# Patient Record
Sex: FEMALE | Race: OTHER | HISPANIC OR LATINO | URBAN - METROPOLITAN AREA
[De-identification: names, ages, dates, MRNs, and addresses within clinical notes are randomized per-mention and may not be internally consistent; named-entity substitution may affect disease eponyms.]

---

## 2018-01-18 ENCOUNTER — EMERGENCY (EMERGENCY)
Facility: HOSPITAL | Age: 53
LOS: 1 days | Discharge: HOME | End: 2018-01-18
Attending: EMERGENCY MEDICINE | Admitting: EMERGENCY MEDICINE
Payer: COMMERCIAL

## 2018-01-18 VITALS
WEIGHT: 220.02 LBS | RESPIRATION RATE: 18 BRPM | HEART RATE: 86 BPM | TEMPERATURE: 98 F | SYSTOLIC BLOOD PRESSURE: 124 MMHG | OXYGEN SATURATION: 95 % | DIASTOLIC BLOOD PRESSURE: 83 MMHG

## 2018-01-18 VITALS
SYSTOLIC BLOOD PRESSURE: 133 MMHG | TEMPERATURE: 98 F | DIASTOLIC BLOOD PRESSURE: 84 MMHG | OXYGEN SATURATION: 100 % | HEART RATE: 85 BPM | RESPIRATION RATE: 16 BRPM

## 2018-01-18 DIAGNOSIS — M79.661 PAIN IN RIGHT LOWER LEG: ICD-10-CM

## 2018-01-18 DIAGNOSIS — M79.89 OTHER SPECIFIED SOFT TISSUE DISORDERS: ICD-10-CM

## 2018-01-18 LAB
ALBUMIN SERPL ELPH-MCNC: 3.7 G/DL — SIGNIFICANT CHANGE UP (ref 3.3–5)
ALP SERPL-CCNC: 67 U/L — SIGNIFICANT CHANGE UP (ref 40–120)
ALT FLD-CCNC: 16 U/L — SIGNIFICANT CHANGE UP (ref 10–45)
ANION GAP SERPL CALC-SCNC: 12 MMOL/L — SIGNIFICANT CHANGE UP (ref 5–17)
AST SERPL-CCNC: 20 U/L — SIGNIFICANT CHANGE UP (ref 10–40)
BILIRUB SERPL-MCNC: 0.8 MG/DL — SIGNIFICANT CHANGE UP (ref 0.2–1.2)
BUN SERPL-MCNC: 11 MG/DL — SIGNIFICANT CHANGE UP (ref 7–23)
CALCIUM SERPL-MCNC: 8.4 MG/DL — SIGNIFICANT CHANGE UP (ref 8.4–10.5)
CHLORIDE SERPL-SCNC: 103 MMOL/L — SIGNIFICANT CHANGE UP (ref 96–108)
CO2 SERPL-SCNC: 25 MMOL/L — SIGNIFICANT CHANGE UP (ref 22–31)
CREAT SERPL-MCNC: 0.68 MG/DL — SIGNIFICANT CHANGE UP (ref 0.5–1.3)
GLUCOSE SERPL-MCNC: 92 MG/DL — SIGNIFICANT CHANGE UP (ref 70–99)
HCT VFR BLD CALC: 37.8 % — SIGNIFICANT CHANGE UP (ref 34.5–45)
HGB BLD-MCNC: 12.5 G/DL — SIGNIFICANT CHANGE UP (ref 11.5–15.5)
MCHC RBC-ENTMCNC: 30.9 PG — SIGNIFICANT CHANGE UP (ref 27–34)
MCHC RBC-ENTMCNC: 33.1 G/DL — SIGNIFICANT CHANGE UP (ref 32–36)
MCV RBC AUTO: 93.3 FL — SIGNIFICANT CHANGE UP (ref 80–100)
PLATELET # BLD AUTO: 149 K/UL — LOW (ref 150–400)
POTASSIUM SERPL-MCNC: 4 MMOL/L — SIGNIFICANT CHANGE UP (ref 3.5–5.3)
POTASSIUM SERPL-SCNC: 4 MMOL/L — SIGNIFICANT CHANGE UP (ref 3.5–5.3)
PROT SERPL-MCNC: 7 G/DL — SIGNIFICANT CHANGE UP (ref 6–8.3)
RBC # BLD: 4.05 M/UL — SIGNIFICANT CHANGE UP (ref 3.8–5.2)
RBC # FLD: 12.9 % — SIGNIFICANT CHANGE UP (ref 10.3–16.9)
SODIUM SERPL-SCNC: 140 MMOL/L — SIGNIFICANT CHANGE UP (ref 135–145)
WBC # BLD: 8 K/UL — SIGNIFICANT CHANGE UP (ref 3.8–10.5)
WBC # FLD AUTO: 8 K/UL — SIGNIFICANT CHANGE UP (ref 3.8–10.5)

## 2018-01-18 PROCEDURE — 36415 COLL VENOUS BLD VENIPUNCTURE: CPT

## 2018-01-18 PROCEDURE — 80053 COMPREHEN METABOLIC PANEL: CPT

## 2018-01-18 PROCEDURE — 93970 EXTREMITY STUDY: CPT

## 2018-01-18 PROCEDURE — 85027 COMPLETE CBC AUTOMATED: CPT

## 2018-01-18 PROCEDURE — 99285 EMERGENCY DEPT VISIT HI MDM: CPT

## 2018-01-18 PROCEDURE — 99284 EMERGENCY DEPT VISIT MOD MDM: CPT | Mod: 25

## 2018-01-18 PROCEDURE — 93970 EXTREMITY STUDY: CPT | Mod: 26

## 2018-01-18 NOTE — ED PROVIDER NOTE - PLAN OF CARE
PHYSICAL THERAPY DAILY NOTE  Time In: 0473  Time Out: 1440  Patient Seen For: PM;Gait training;Patient education;Transfer training; Other (see progress notes)    Subjective: Patient reporting walking is easier with the AFO on but she will need to get use to it. Reports the AFO feels different than the ace wrap. Objective:Vital Signs:  Patient Vitals for the past 12 hrs:   Temp Pulse Resp BP SpO2   09/05/17 0754 97.9 °F (36.6 °C) 95 16 115/66 94 %     Pain level:No c/o pain  Pain location:NA  Pain interventions:NA    Patient education:gait training with right custom AFO. Instructed patient on how to don/doff AFO and skin inspection for foot ankle and fibular head. Patient verbalizing understanding    Interdisciplinary Communication:NA    Other (comment) (Fall precautions)  GROSS ASSESSMENT Daily Assessment     recd right custom AFO from orthotist. Will need to be reassessed by orthotist for making adjustments at ankle and foot plate. BED/MAT MOBILITY Daily Assessment    Rolling Right : 0 (Not tested)  Rolling Left : 0 (Not tested)  Supine to Sit : 0 (Not tested)  Sit to Supine : 0 (Not tested)       TRANSFERS Daily Assessment    Transfer Type: SPT without device (w/c to bed)  Transfer Assistance : 6 (Modified independent)  Sit to Stand Assistance: Modified independent  Car Transfers: Not tested       GAIT Daily Assessment    Amount of Assistance: 5 (Supervision/setup) verbal cues for improving LLE step length and RLE step clearance  Distance (ft): 150 Feet (ft)  Assistive Device: Cane, quad;Orthotic device (custom right AFO)   Gait training up/down 10 ft ramp with quad cane and SBA, slow start/stop step to hemiplegic gait pattern. Gait training x 200ft with shopping cart jhonatan right AFO facilitating cont step through gait pattern with increased step length and gait speed. Min assist to control shopping cart     STEPS or STAIRS Daily Assessment   Single step at a time going up/down 4 steps.  Right LE hip hiking and circumduction going up steps. Steps/Stairs Ambulated (#): 4  Level of Assist : 5 (Stand-by assistance)  Rail Use: Left        BALANCE Daily Assessment    Sitting - Static: Good (unsupported)  Sitting - Dynamic: Good (unsupported)  Standing - Static: Good  Standing - Dynamic : Impaired       WHEELCHAIR MOBILITY Daily Assessment    Able to Propel (ft): 150 feet  Functional Level: 6  Curbs/Ramps Assist Required (FIM Score): 0 (Not tested)  Wheelchair Setup Assist Required : 6 (Modified independent)  Wheelchair Management: Manages left brake;Manages right brake;Manages right footrest       LOWER EXTREMITY EXERCISES Daily Assessment    NA          Assessment: Improved step clearance step length and gait speed ambulating with AFO. Improved step clearance and foot placement going up/down steps and up/down ramp with AFO. Recommending patient get one shoe size larger for right foot       Patient returned to room at end of treatment. Patient supine in bed with head of bed elevated and bed rails up x 2. Needs placed in reach of patient.     Plan of Care: complete D/C assessment tomorrow AM    Anahy Jung, PT  9/5/2017 Please follow up with your primary care provider for further management of leg swelling.  The swelling is likely due to venous stasis/insufficiency.  You can wear compression stockings to help reduce the swelling.

## 2018-01-18 NOTE — ED ADULT NURSE NOTE - CHPI ED SYMPTOMS NEG
no fever/no dizziness/no vomiting/no chills/no tingling/no weakness/no numbness/no decreased eating/drinking/no nausea

## 2018-01-18 NOTE — ED ADULT TRIAGE NOTE - ARRIVAL INFO ADDITIONAL COMMENTS
c/o left knee pain and stiffness, swelling to left calf X 2 months - atraumatic. pt also reports nonproductive cough and runny nose X 3 days

## 2018-01-18 NOTE — ED PROVIDER NOTE - OBJECTIVE STATEMENT
52F w/ no known PHMx presents with 1.5 months of chronic B/L LE edema w/ tenderness.  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  She noted that last month, she developed B/L LE swelling but has not had it evaluated.  She now feels like her legs are going to burst and had some extra time today, so came for evaluation.  She has a mild non-productive cough w/out overt SOB.  She denies CP.

## 2018-01-18 NOTE — ED PROVIDER NOTE - MEDICAL DECISION MAKING DETAILS
-Likely venous stasis; less likely acute cardiac or DVT event.  CBC/CMP, LE doppler to eval w/ plan for d/c for outpatient follow up -Likely venous stasis; less likely acute cardiac or DVT event.  CBC/CMP, LE doppler to eval w/ plan for d/c for outpatient follow up  -Doppler unremarkable; labs unremarkable

## 2018-01-18 NOTE — ED PROVIDER NOTE - CONDUCTED A DETAILED DISCUSSION WITH PATIENT AND/OR GUARDIAN REGARDING, MDM
radiology results/lab results lab results/return to ED if symptoms worsen, persist or questions arise/radiology results

## 2018-01-18 NOTE — ED ADULT NURSE NOTE - OBJECTIVE STATEMENT
52 year old female w c/o of L lower leg pain.  Denies trauma or falls.  A+OX3, ambulatory w steady gait.

## 2018-01-18 NOTE — ED PROVIDER NOTE - CARE PLAN
Principal Discharge DX:	Leg swelling Principal Discharge DX:	Leg swelling  Assessment and plan of treatment:	Please follow up with your primary care provider for further management of leg swelling.  The swelling is likely due to venous stasis/insufficiency.  You can wear compression stockings to help reduce the swelling.

## 2018-01-18 NOTE — ED PROVIDER NOTE - ATTENDING CONTRIBUTION TO CARE
here with lower extremity edema for about 2 months.  works on train and always sitting or standing.  no redness/warmth.  denies chest pain, sob, orthopnea.  suspect venous stasis.  duplex and labs wnl.  rec compression stockings, leg elevation, pmd f/u

## 2018-09-14 ENCOUNTER — APPOINTMENT (OUTPATIENT)
Dept: ORTHOPEDIC SURGERY | Facility: CLINIC | Age: 53
End: 2018-09-14

## 2019-03-17 ENCOUNTER — EMERGENCY (EMERGENCY)
Facility: HOSPITAL | Age: 54
LOS: 1 days | Discharge: HOME | End: 2019-03-17
Admitting: EMERGENCY MEDICINE
Payer: COMMERCIAL

## 2019-03-17 VITALS
HEIGHT: 69 IN | SYSTOLIC BLOOD PRESSURE: 115 MMHG | OXYGEN SATURATION: 98 % | HEART RATE: 60 BPM | WEIGHT: 238.1 LBS | TEMPERATURE: 98 F | RESPIRATION RATE: 18 BRPM | DIASTOLIC BLOOD PRESSURE: 77 MMHG

## 2019-03-17 DIAGNOSIS — M25.561 PAIN IN RIGHT KNEE: ICD-10-CM

## 2019-03-17 DIAGNOSIS — Z79.899 OTHER LONG TERM (CURRENT) DRUG THERAPY: ICD-10-CM

## 2019-03-17 PROCEDURE — 99283 EMERGENCY DEPT VISIT LOW MDM: CPT

## 2019-03-17 PROCEDURE — 73562 X-RAY EXAM OF KNEE 3: CPT

## 2019-03-17 PROCEDURE — 96372 THER/PROPH/DIAG INJ SC/IM: CPT

## 2019-03-17 PROCEDURE — 73562 X-RAY EXAM OF KNEE 3: CPT | Mod: 26,RT

## 2019-03-17 PROCEDURE — 99283 EMERGENCY DEPT VISIT LOW MDM: CPT | Mod: 25

## 2019-03-17 RX ORDER — ACETAMINOPHEN WITH CODEINE 300MG-30MG
1 TABLET ORAL
Qty: 12 | Refills: 0 | OUTPATIENT
Start: 2019-03-17 | End: 2019-03-19

## 2019-03-17 RX ORDER — KETOROLAC TROMETHAMINE 30 MG/ML
60 SYRINGE (ML) INJECTION ONCE
Qty: 0 | Refills: 0 | Status: DISCONTINUED | OUTPATIENT
Start: 2019-03-17 | End: 2019-03-17

## 2019-03-17 RX ADMIN — Medication 60 MILLIGRAM(S): at 12:02

## 2019-03-17 NOTE — ED PROVIDER NOTE - CLINICAL SUMMARY MEDICAL DECISION MAKING FREE TEXT BOX
Patient with chronic right knee pain. REcommend f/u with ortho clinic. RICE  and anti inflammatory meds pRN

## 2019-03-17 NOTE — ED ADULT NURSE NOTE - OBJECTIVE STATEMENT
Pt reports right knee pain after an injury "a while ago." Pt reports worsening pain for the last 3 days, difficulty bearing weight

## 2019-03-17 NOTE — ED ADULT NURSE NOTE - CHPI ED NUR SYMPTOMS NEG
no abrasion/no deformity/no weakness/no bruising/no numbness/no tingling/no stiffness/no fever/no back pain

## 2019-03-17 NOTE — ED ADULT NURSE NOTE - NSIMPLEMENTINTERV_GEN_ALL_ED
Implemented All Universal Safety Interventions:  [place] to call system. Call bell, personal items and telephone within reach. Instruct patient to call for assistance. Room bathroom lighting operational. Non-slip footwear when patient is off stretcher. Physically safe environment: no spills, clutter or unnecessary equipment. Stretcher in lowest position, wheels locked, appropriate side rails in place.

## 2019-03-17 NOTE — ED PROVIDER NOTE - CARE PROVIDER_API CALL
Lio Vu)  Orthopaedic Surgery  [address] [place] [address]  Phone: [phone]  Fax: [phone]  Follow Up Time:     Damir Meneses)  Orthopaedic Surgery Surgery  [address] [place] [address]  Phone: [phone]  Fax: [phone]  Follow Up Time:

## 2020-12-24 ENCOUNTER — EMERGENCY (EMERGENCY)
Facility: HOSPITAL | Age: 55
LOS: 1 days | Discharge: HOME | End: 2020-12-24
Attending: EMERGENCY MEDICINE | Admitting: EMERGENCY MEDICINE
Payer: COMMERCIAL

## 2020-12-24 VITALS
TEMPERATURE: 98 F | HEART RATE: 91 BPM | DIASTOLIC BLOOD PRESSURE: 78 MMHG | SYSTOLIC BLOOD PRESSURE: 145 MMHG | WEIGHT: 239.2 LBS | OXYGEN SATURATION: 95 % | RESPIRATION RATE: 18 BRPM | HEIGHT: 69 IN

## 2020-12-24 DIAGNOSIS — L29.9 PRURITUS, UNSPECIFIED: ICD-10-CM

## 2020-12-24 DIAGNOSIS — R21 RASH AND OTHER NONSPECIFIC SKIN ERUPTION: ICD-10-CM

## 2020-12-24 PROCEDURE — 99283 EMERGENCY DEPT VISIT LOW MDM: CPT

## 2020-12-24 RX ORDER — PERMETHRIN CREAM 5% W/W 50 MG/G
1 CREAM TOPICAL
Qty: 1 | Refills: 1
Start: 2020-12-24

## 2020-12-24 NOTE — ED ADULT NURSE NOTE - CHPI ED NUR SYMPTOMS NEG
no body aches/no chills/no confusion/no decreased eating/drinking/no fever/no inflammation/no pain/no petechia/no scaly patches on skin/no vomiting

## 2020-12-24 NOTE — ED ADULT NURSE NOTE - SKIN INTEGRITY
rash
Implemented All Universal Safety Interventions:  [place] to call system. Call bell, personal items and telephone within reach. Instruct patient to call for assistance. Room bathroom lighting operational. Non-slip footwear when patient is off stretcher. Physically safe environment: no spills, clutter or unnecessary equipment. Stretcher in lowest position, wheels locked, appropriate side rails in place.

## 2020-12-24 NOTE — ED ADULT NURSE NOTE - OBJECTIVE STATEMENT
pt is a 56 y/o F arriving to the ED for c/c " I work for the [employer] and I think I got lice". pt endorses pruritis to scalp, bilateral arms, chest, and back x 1 week. pt presents with linear rash to Lt upper extremity and upper back. Denies n/v/d, fever, chills, cough, cp or sob.

## 2020-12-24 NOTE — ED PROVIDER NOTE - CLINICAL SUMMARY MEDICAL DECISION MAKING FREE TEXT BOX
54 yo female in the ER c/o diffuse generalize body rash. Pt states she works for [employer] and she probably was exposed to lice or scabies. # days ago she noted a small bug on her short. Itching started that night. pt has no other complaints on exam- no lice visualized, but rash are in some areas linear , more suspicious for scabies. Will d/c home with Rx for Permethrin topical and recommend dermatology f/u in a few days if no improvement. pt agrees with a plan.

## 2020-12-24 NOTE — ED PROVIDER NOTE - OBJECTIVE STATEMENT
54 yo female in the ER c/o diffuse generalize body rash. Pt states she works for [employer] and she probably was exposed to lice or scabies. # days ago she noted a small bug on her short. Itching started that night. pt has no other complaints.

## 2020-12-24 NOTE — ED PROVIDER NOTE - PATIENT PORTAL LINK FT
You can access the FollowMyHealth Patient Portal offered by [facility] by registering at the following website: [email or web address]. By joining [facility]’s FollowMyHealth portal, you will also be able to view your health information using other applications (apps) compatible with our system.

## 2020-12-24 NOTE — ED PROVIDER NOTE - NSFOLLOWUPINSTRUCTIONS_ED_ALL_ED_FT
BODY LICE - General Information           Body Lice    WHAT YOU NEED TO KNOW:    What are body lice? Body lice are tiny bugs that attach to your skin and live on tiny amounts of blood. Body lice like to bite soft skin areas where clothes fit tight to the body, such as the groin, waist, or armpits. Body lice are light gray and about the size of a sesame seed. Body lice are spread through contact with contaminated clothes or bedding.    What are the signs and symptoms of body lice?   •Severe itching      •Rash or swelling near the hair strands      How are body lice diagnosed and treated? Your healthcare provider will ask you about your signs and symptoms and examine you. Lice medicine is used to kill body lice and is available without a doctor's order. Lice medicine usually comes as a lotion or cream. Use it as directed. Throw away all lice medicine that you do not use. Keep it away from your eyes. Other medicines may also be given to decrease itching and inflammation.    How can I manage or prevent body lice?   •Take a hot shower and wash all clothes, towels, and bedding in hot, soapy water. Dry them on the hot cycle for at least 20 minutes. Items that cannot be washed or dry cleaned should be sealed in an airtight plastic bag for 2 weeks. Do not share towels and sheets with others. Vacuum furniture, rugs, carpets, car seats, or other fabrics.      •Do not have close body contact with anyone until all your lice are gone.       When should I call my doctor?   •You have a fever.      •Your body lice do not go away, even after treatment.       •The lice bites become crusty or filled with pus, or your skin has a bad smell.       •Your skin burns, stings, swells, or is numb after you use lice medicine.      •You have questions or concerns about your condition or care.      CARE AGREEMENT:    You have the right to help plan your care. Learn about your health condition and how it may be treated. Discuss treatment options with your healthcare providers to decide what care you want to receive. You always have the right to refuse treatment.

## 2020-12-24 NOTE — ED PROVIDER NOTE - SKIN, MLM
Skin normal color for race, warm, dry and intact.  diffuse pruritic rash over the upper and lower extremities, trunk, back, some area in linear distribution, no sores, no excoriations,

## 2020-12-24 NOTE — ED ADULT NURSE NOTE - NSIMPLEMENTINTERV_GEN_ALL_ED
Implemented All Universal Safety Interventions:  [place] to call system. Call bell, personal items and telephone within reach. Instruct patient to call for assistance. Room bathroom lighting operational. Non-slip footwear when patient is off stretcher. Physically safe environment: no spills, clutter or unnecessary equipment. Stretcher in lowest position, wheels locked, appropriate side rails in place.

## 2022-12-12 NOTE — ED ADULT NURSE NOTE - RESPIRATORY WDL
Ok to complete physical form?   Patient had physical with Dr. Kade Good 9/22/22  And attached negative TB test result from 9/26/22 Breathing spontaneous and unlabored. Breath sounds clear and equal bilaterally with regular rhythm.

## 2023-02-15 NOTE — ED ADULT NURSE NOTE - HARM RISK FACTORS
Detail Level: Zone
Add 00742 Cpt? (Important Note: In 2017 The Use Of 31351 Is Being Tracked By Cms To Determine Future Global Period Reimbursement For Global Periods): yes
no

## 2023-12-26 NOTE — ED ADULT NURSE NOTE - EXTENSIONS OF SELF_ADULT
Patient states she developed COVID symptoms on the evening of 12/24/23. She reports cough, chest congestion, sore throat, nausea, temp as high as 100.8, and intermittent SOB. She states she tested positive for COVID via home test yesterday and today. Patient denies constant chest discomfort, difficulty breathing, wheezing, or weakness. Recommended eval at  as no available appointments in  today. Patient states understanding.       Reason for Disposition   MILD difficulty breathing (e.g., minimal/no SOB at rest, SOB with walking, pulse <100)    Protocols used: Coronavirus (COVID-19) Diagnosed or [id number]    
Patient states she is covid positive,and is at high risk. Patient also has a fever of 100.8. Patient is asking if she should go to urgent care or get something prescribed.   
None

## 2024-02-22 NOTE — ED ADULT NURSE NOTE - CAS TRG GEN SKIN COLOR
Patient notified of results of pelvic ultrasound via [facility]. 
Normal for race